# Patient Record
Sex: FEMALE | URBAN - NONMETROPOLITAN AREA
[De-identification: names, ages, dates, MRNs, and addresses within clinical notes are randomized per-mention and may not be internally consistent; named-entity substitution may affect disease eponyms.]

---

## 2020-05-20 ENCOUNTER — TELEPHONE (OUTPATIENT)
Dept: NEUROSURGERY | Facility: CLINIC | Age: 40
End: 2020-05-20

## 2020-05-20 NOTE — TELEPHONE ENCOUNTER
PATIENT RETURNED CALL WITH AN UPDATE. PATIENT CONTACTED PCP REQUESTING REFERRAL TO BE SENT FOR . PATIENT REFUSES TO COMPLETE IMAGING FROM PCP DUE TO DISSATISFACTION OF IMAGING FACILITY. HUB CONTACTED PCP TO VALIDATE REQUEST & CONFIRM HUB FAX NUMBER.    PCP: [facility]  PHONE:[phone]    PLEASE CONTACT PATIENT -[id number] FOR ANY ADDITIONAL INFO NEEDED.